# Patient Record
Sex: MALE | Race: WHITE | NOT HISPANIC OR LATINO | Employment: FULL TIME | URBAN - METROPOLITAN AREA
[De-identification: names, ages, dates, MRNs, and addresses within clinical notes are randomized per-mention and may not be internally consistent; named-entity substitution may affect disease eponyms.]

---

## 2019-04-17 ENCOUNTER — SKIN CANCER EXAM (OUTPATIENT)
Dept: URBAN - METROPOLITAN AREA CLINIC 34 | Facility: CLINIC | Age: 34
Setting detail: DERMATOLOGY
End: 2019-04-17

## 2019-04-17 DIAGNOSIS — Z41.9 ENCOUNTER FOR PROCEDURE FOR PURPOSES OTHER THAN REMEDYING HEALTH STATE, UNSPECIFIED: ICD-10-CM

## 2019-04-17 PROBLEM — D18.01 HEMANGIOMA OF SKIN AND SUBCUTANEOUS TISSUE: Status: RESOLVED | Noted: 2019-04-17

## 2019-04-17 PROCEDURE — 17110 DESTRUCT B9 LESION 1-14: CPT

## 2019-04-17 PROCEDURE — 99203 OFFICE O/P NEW LOW 30 MIN: CPT

## 2019-04-17 PROCEDURE — 11102 TANGNTL BX SKIN SINGLE LES: CPT

## 2019-04-20 ENCOUNTER — WART (OUTPATIENT)
Dept: URBAN - METROPOLITAN AREA CLINIC 34 | Facility: CLINIC | Age: 34
Setting detail: DERMATOLOGY
End: 2019-04-20

## 2019-04-20 DIAGNOSIS — L70.0 ACNE VULGARIS: ICD-10-CM

## 2019-04-20 PROCEDURE — OTHER NO CHARGE OFFICE VISIT: OTHER

## 2019-04-20 RX ORDER — AZITHROMYCIN DIHYDRATE 500 MG/1
1 TABLET TABLET, FILM COATED ORAL AS DIRECTED
Qty: 3 | Refills: 0
Start: 2019-04-20

## 2019-04-24 ENCOUNTER — FOLLOW UP (OUTPATIENT)
Dept: URBAN - METROPOLITAN AREA CLINIC 34 | Facility: CLINIC | Age: 34
Setting detail: DERMATOLOGY
End: 2019-04-24

## 2019-04-24 PROCEDURE — OTHER NO CHARGE OFFICE VISIT: OTHER

## 2021-01-12 ENCOUNTER — OFFICE VISIT (OUTPATIENT)
Dept: URBAN - METROPOLITAN AREA CLINIC 35 | Facility: CLINIC | Age: 36
End: 2021-01-12

## 2021-01-12 VITALS
SYSTOLIC BLOOD PRESSURE: 120 MMHG | TEMPERATURE: 98.2 F | BODY MASS INDEX: 26.77 KG/M2 | WEIGHT: 187 LBS | DIASTOLIC BLOOD PRESSURE: 80 MMHG | HEIGHT: 70 IN

## 2021-01-12 PROBLEM — 10743008: Status: ACTIVE | Noted: 2021-01-12

## 2021-01-12 RX ORDER — DICYCLOMINE HYDROCHLORIDE 10 MG/1
1 CAPSULE CAPSULE ORAL BID
Qty: 60 CAPSULE | Refills: 1 | OUTPATIENT
Start: 2021-01-12

## 2021-01-12 RX ORDER — DICYCLOMINE HYDROCHLORIDE 10 MG/1
1 TABLET CAPSULE ORAL BID
Qty: 60 TABLET | Refills: 0 | OUTPATIENT
Start: 2021-01-12

## 2021-01-12 NOTE — HPI-MIGRATED HPI
;     Change in Bowel habits : 35 year old male presents today with complaints of change in bowel habits and rectal pain. Onset of change of BM started in January 2020 and rectal pain started when he was 10 years old and is sporadic. He denies ever seeking treatment for this.   Within the past month patient admits increase in BM's; he has 2-4 BM's per day. Stools are mushy. He denies melena, blood, or mucus. No BM's through the night. No abdominal pain/cramps prior to BM. His previous BMs were 2 per day, with normal stool. He admits increased bloating and very foul smelling gas.   Patient states in January 2020 he was in the [facility] ER for a stomach virus with emesis and diarrhea. That lasted 3-4 days. Since then his stools has not returned to normal. Prior to illness he was having 1-2 BMs per day, with nl stool.    Spicy foods is an aggravating factor. He admits recently losing some weight in January 2020 with diet control.   He admits when he made the appointment he had 3-4 episodes of rectal pain that day. He states that pain is sharp and starts at the rectum and goes to his stomach. This normally occurs 1-2 times a year. He describes it as a jolt.  He denies rectal pain with BM or when he sits.    No abdominal pain  No previous testing related to rectal pain.   Labs completed 10/19/2020 with PCP CBC: NL BMP: NL;

## 2021-01-12 NOTE — EXAM-MIGRATED EXAMINATIONS
GENERAL APPEARANCE: - alert, in no acute distress, well developed, well nourished;   HEAD: - normocephalic, atraumatic;   EYES: - sclera anicteric bilaterally;   ORAL CAVITY: - mucosa moist;   THROAT: - clear;   NECK/THYROID: - neck supple, full range of motion, no cervical lymphadenopathy,  no thyromegaly, trachea midline;   SKIN: - warm and dry, no spider angiomata, palmar erythema or icterus;   HEART: - no murmurs, regular rate and rhythm, S1, S2 normal;   LUNGS: - clear to auscultation bilaterally, good air movement, no wheezes, rales, rhonchi;   ABDOMEN: - bowel sounds present, no masses palpable, no organomegaly , no rebound tenderness, soft, nontender, nondistended;   MUSCULOSKELETAL: - normal posture, normal gait and station, no decreased range of motion;   EXTREMITIES: - no clubbing, cyanosis, or edema;   PSYCH: - cooperative with exam, mood/affect full range;

## 2021-01-18 ENCOUNTER — TELEPHONE ENCOUNTER (OUTPATIENT)
Dept: URBAN - METROPOLITAN AREA CLINIC 35 | Facility: CLINIC | Age: 36
End: 2021-01-18

## 2021-02-09 ENCOUNTER — OFFICE VISIT (OUTPATIENT)
Dept: URBAN - METROPOLITAN AREA CLINIC 35 | Facility: CLINIC | Age: 36
End: 2021-02-09

## 2021-02-09 VITALS
BODY MASS INDEX: 26.77 KG/M2 | WEIGHT: 187 LBS | HEIGHT: 70 IN | TEMPERATURE: 97.4 F | SYSTOLIC BLOOD PRESSURE: 122 MMHG | DIASTOLIC BLOOD PRESSURE: 84 MMHG

## 2021-02-09 RX ORDER — DICYCLOMINE HYDROCHLORIDE 10 MG/1
1 TABLET CAPSULE ORAL BID
Qty: 60 TABLET | Refills: 0 | Status: ON HOLD | COMMUNITY
Start: 2021-01-12

## 2021-02-09 RX ORDER — SODIUM, POTASSIUM,MAG SULFATES 17.5-3.13G
177 ML SOLUTION, RECONSTITUTED, ORAL ORAL ONCE
Qty: 1 KIT | Refills: 0 | OUTPATIENT
Start: 2021-02-09

## 2021-02-09 RX ORDER — DICYCLOMINE HYDROCHLORIDE 10 MG/1
1 CAPSULE CAPSULE ORAL BID
Qty: 60 CAPSULE | Refills: 1 | Status: ACTIVE | COMMUNITY
Start: 2021-01-12

## 2021-02-09 NOTE — HPI-MIGRATED HPI
;     Change in Bowel habits : Patient presents today for follow up of change in bowel habits and to review stool results. Since last visit he has cut back on his lactose intake and started Florastor 250 mg BID plus Bentyl 10 mg po BID. He admits some improvement of gas, bloating but no resolution. There is improvement on stool frequency but not normal yet. No episodes of Proctalgia fugax since initial visit.  1 BM per day, with soft/mushy stool. No melena, blood, or mucus. He reports though 1-2 days out of the week he will have 3-4 BM's with loose stools.   Stools collected on 1/13/2021 Campylobacter: Not Detected Clostridium difficile: Not Detected E Coli: Not Detected Enterotoxigenic: Not Detected Salmonella: Not Detected Shigella: Not Detected Shiga-like toxin: Not Detected Vibrio cholerae: Not Detected  Norovirus: Not Detected Rotavirus: Not Detected Adenovirus: Not Detected Parasites: Not Detected Cryptosporidium: Not Detected Entamoeba histolytica: Not Detected Giardia: Not Detected  Ova and Parasites: Not Detected Fecal fat: Normal Calprotectin: Normal Pancreatic elastase: Normal Lactoferrin: Normal C. difficile toxin A/B Negative  Last visit (1/12/2021) 35 year old male presents today with complaints of change in bowel habits and rectal pain. Onset of change of BM started in January 2020 and rectal pain started when he was 10 years old and is sporadic. He denies ever seeking treatment for this.   Within the past month patient admits increase in BM's; he has 2-4 BM's per day. Stools are mushy. He denies melena, blood, or mucus. No BM's through the night. No abdominal pain/cramps prior to BM. His previous BMs were 2 per day, with normal stool. He admits increased bloating and very foul smelling gas.   Patient states in January 2020 he was in the [facility] ER for a stomach virus with emesis and diarrhea. That lasted 3-4 days. Since then his stools has not returned to normal. Prior to illness he was having 1-2 BMs per day, with nl stool.    Spicy foods is an aggravating factor. He admits recently losing some weight in January 2020 with diet control.   He admits when he made the appointment he had 3-4 episodes of rectal pain that day. He states that pain is sharp and starts at the rectum and goes to his stomach. This normally occurs 1-2 times a year. He describes it as a jolt.  He denies rectal pain with BM or when he sits.    No abdominal pain  No previous testing related to rectal pain.   Labs completed 10/19/2020 with PCP CBC: NL BMP: NL;

## 2021-02-09 NOTE — EXAM-MIGRATED EXAMINATIONS
GENERAL APPEARANCE: - alert, in no acute distress, well developed, well nourished;   HEAD: - normocephalic, atraumatic;   ORAL CAVITY: - mucosa moist, MP 3;   HEART: - no murmurs, regular rate and rhythm, S1, S2 normal;   LUNGS: - clear to auscultation bilaterally, good air movement, no wheezes, rales, rhonchi;   ABDOMEN: - bowel sounds present, no masses palpable, no organomegaly , no rebound tenderness, soft, nontender, nondistended;   EXTREMITIES: - no clubbing, cyanosis, or edema;

## 2021-02-16 ENCOUNTER — TELEPHONE ENCOUNTER (OUTPATIENT)
Dept: URBAN - METROPOLITAN AREA CLINIC 35 | Facility: CLINIC | Age: 36
End: 2021-02-16

## 2021-02-16 RX ORDER — DICYCLOMINE HYDROCHLORIDE 10 MG/1
1 TABLET CAPSULE ORAL BID
Qty: 60 TABLET | Refills: 1 | OUTPATIENT
Start: 2021-01-12

## 2021-02-22 ENCOUNTER — OFFICE VISIT (OUTPATIENT)
Dept: URBAN - METROPOLITAN AREA SURGERY CENTER 8 | Facility: SURGERY CENTER | Age: 36
End: 2021-02-22

## 2021-03-01 ENCOUNTER — TELEPHONE ENCOUNTER (OUTPATIENT)
Dept: URBAN - METROPOLITAN AREA CLINIC 35 | Facility: CLINIC | Age: 36
End: 2021-03-01

## 2021-03-01 RX ORDER — DICYCLOMINE HYDROCHLORIDE 10 MG/1
1 CAPSULE CAPSULE ORAL BID
Qty: 60 CAPSULE | Refills: 1 | Status: ACTIVE | COMMUNITY
Start: 2021-01-12

## 2021-03-01 RX ORDER — SODIUM, POTASSIUM,MAG SULFATES 17.5-3.13G
177 ML SOLUTION, RECONSTITUTED, ORAL ORAL ONCE
Qty: 1 KIT | Refills: 0 | Status: ACTIVE | COMMUNITY
Start: 2021-02-09

## 2021-03-01 RX ORDER — RIFAXIMIN 550 MG/1
1 TABLET TABLET ORAL THREE TIMES A DAY
Qty: 42 TABLET | Refills: 1 | OUTPATIENT
Start: 2021-03-02

## 2021-03-01 RX ORDER — DICYCLOMINE HYDROCHLORIDE 10 MG/1
1 TABLET CAPSULE ORAL BID
Qty: 60 TABLET | Refills: 1 | Status: ACTIVE | COMMUNITY
Start: 2021-01-12

## 2021-03-10 ENCOUNTER — TELEPHONE ENCOUNTER (OUTPATIENT)
Dept: URBAN - METROPOLITAN AREA CLINIC 35 | Facility: CLINIC | Age: 36
End: 2021-03-10

## 2021-03-10 ENCOUNTER — OFFICE VISIT (OUTPATIENT)
Dept: URBAN - METROPOLITAN AREA CLINIC 35 | Facility: CLINIC | Age: 36
End: 2021-03-10

## 2021-03-10 VITALS
WEIGHT: 185 LBS | SYSTOLIC BLOOD PRESSURE: 119 MMHG | BODY MASS INDEX: 26.48 KG/M2 | HEIGHT: 70 IN | DIASTOLIC BLOOD PRESSURE: 70 MMHG

## 2021-03-10 RX ORDER — SODIUM, POTASSIUM,MAG SULFATES 17.5-3.13G
177 ML SOLUTION, RECONSTITUTED, ORAL ORAL ONCE
Qty: 1 KIT | Refills: 0 | Status: ACTIVE | COMMUNITY
Start: 2021-02-09

## 2021-03-10 RX ORDER — DICYCLOMINE HYDROCHLORIDE 10 MG/1
1 TABLET CAPSULE ORAL BID
Qty: 60 TABLET | Refills: 1 | Status: ACTIVE | COMMUNITY
Start: 2021-01-12

## 2021-03-10 RX ORDER — DICYCLOMINE HYDROCHLORIDE 10 MG/1
1 CAPSULE CAPSULE ORAL BID
Qty: 60 CAPSULE | Refills: 1 | Status: ACTIVE | COMMUNITY
Start: 2021-01-12

## 2021-03-10 RX ORDER — RIFAXIMIN 550 MG/1
1 TABLET TABLET ORAL THREE TIMES A DAY
Qty: 42 TABLET | Refills: 1 | Status: ACTIVE | COMMUNITY
Start: 2021-03-02

## 2021-03-10 NOTE — HPI-MIGRATED HPI
;     Change in Bowel habits : Patient presents today for follow up of change in bowel habits and to review colonoscopy completed on 2/22/2021. Patient denies any complications post procedure.   Patient started the Xifaxan as for IBS-D on 3/6/2021, 2-3 BM's per day with a sense of urgency. He states that stool varies in consistency from soft mushy-pieces. He admits having takeout on Sunday that consisted of cheese. No melena, blood, or mucus.   No bloating, no abdominal plain.    No nocturnal symptoms.  Patient is mildly lactose intolerant; certain lactose product.  Last visit (2/9/2021) Patient presents today for follow up of change in bowel habits and to review stool results. Since last visit he has cut back on his lactose intake and started Florastor 250 mg BID plus Bentyl 10 mg po BID. He admits some improvement of gas, bloating but no resolution. There is improvement on stool frequency but not normal yet. No episodes of Proctalgia fugax since initial visit.  1 BM per day, with soft/mushy stool. No melena, blood, or mucus. He reports though 1-2 days out of the week he will have 3-4 BM's with loose stools.   Stools collected on 1/13/2021 Campylobacter: Not Detected Clostridium difficile: Not Detected E Coli: Not Detected Enterotoxigenic: Not Detected Salmonella: Not Detected Shigella: Not Detected Shiga-like toxin: Not Detected Vibrio cholerae: Not Detected  Norovirus: Not Detected Rotavirus: Not Detected Adenovirus: Not Detected Parasites: Not Detected Cryptosporidium: Not Detected Entamoeba histolytica: Not Detected Giardia: Not Detected  Ova and Parasites: Not Detected Fecal fat: Normal Calprotectin: Normal Pancreatic elastase: Normal Lactoferrin: Normal C. difficile toxin A/B Negative  Last visit (1/12/2021) 35 year old male presents today with complaints of change in bowel habits and rectal pain. Onset of change of BM started in January 2020 and rectal pain started when he was 10 years old and is sporadic. He denies ever seeking treatment for this.   Within the past month patient admits increase in BM's; he has 2-4 BM's per day. Stools are mushy. He denies melena, blood, or mucus. No BM's through the night. No abdominal pain/cramps prior to BM. His previous BMs were 2 per day, with normal stool. He admits increased bloating and very foul smelling gas.   Patient states in January 2020 he was in the [facility] ER for a stomach virus with emesis and diarrhea. That lasted 3-4 days. Since then his stools has not returned to normal. Prior to illness he was having 1-2 BMs per day, with nl stool.    Spicy foods is an aggravating factor. He admits recently losing some weight in January 2020 with diet control.   He admits when he made the appointment he had 3-4 episodes of rectal pain that day. He states that pain is sharp and starts at the rectum and goes to his stomach. This normally occurs 1-2 times a year. He describes it as a jolt.  He denies rectal pain with BM or when he sits.    No abdominal pain  No previous testing related to rectal pain.   Labs completed 10/19/2020 with PCP CBC: NL BMP: NL;

## 2021-05-11 ENCOUNTER — TELEPHONE ENCOUNTER (OUTPATIENT)
Dept: URBAN - METROPOLITAN AREA CLINIC 35 | Facility: CLINIC | Age: 36
End: 2021-05-11

## 2021-05-11 ENCOUNTER — OFFICE VISIT (OUTPATIENT)
Dept: URBAN - METROPOLITAN AREA CLINIC 35 | Facility: CLINIC | Age: 36
End: 2021-05-11

## 2021-05-11 VITALS
DIASTOLIC BLOOD PRESSURE: 70 MMHG | BODY MASS INDEX: 26.63 KG/M2 | WEIGHT: 186 LBS | TEMPERATURE: 97.7 F | SYSTOLIC BLOOD PRESSURE: 110 MMHG | HEIGHT: 70 IN

## 2021-05-11 RX ORDER — RIFAXIMIN 550 MG/1
1 TABLET TABLET ORAL THREE TIMES A DAY
Qty: 42 TABLET | Refills: 1 | Status: ON HOLD | COMMUNITY
Start: 2021-03-02

## 2021-05-11 RX ORDER — SODIUM, POTASSIUM,MAG SULFATES 17.5-3.13G
177 ML SOLUTION, RECONSTITUTED, ORAL ORAL ONCE
Qty: 1 KIT | Refills: 0 | Status: ON HOLD | COMMUNITY
Start: 2021-02-09

## 2021-05-11 RX ORDER — DICYCLOMINE HYDROCHLORIDE 10 MG/1
1 CAPSULE CAPSULE ORAL BID
Qty: 60 CAPSULE | Refills: 1 | Status: ON HOLD | COMMUNITY
Start: 2021-01-12

## 2021-05-11 RX ORDER — DICYCLOMINE HYDROCHLORIDE 10 MG/1
1 TABLET CAPSULE ORAL BID
Qty: 60 TABLET | Refills: 1 | Status: ACTIVE | COMMUNITY
Start: 2021-01-12

## 2021-05-11 RX ORDER — DICYCLOMINE HYDROCHLORIDE 10 MG/1
1 CAPSULE CAPSULE ORAL BID
Qty: 180 CAPSULE | Refills: 1 | OUTPATIENT
Start: 2021-05-11

## 2021-05-11 NOTE — EXAM-MIGRATED EXAMINATIONS
GENERAL APPEARANCE: - alert, in no acute distress, well developed, well nourished;   HEAD: - normocephalic, atraumatic;   EYES: - sclera anicteric bilaterally;   ORAL CAVITY: - mucosa moist;   HEART: - no murmurs, regular rate and rhythm, S1, S2 normal;   LUNGS: - clear to auscultation bilaterally, good air movement, no wheezes, rales, rhonchi;   ABDOMEN: - bowel sounds present, no masses palpable, no organomegaly , no rebound tenderness, soft, mild tenderness in LLQ area, nondistended;   PSYCH: - cooperative with exam, mood/affect full range;

## 2021-05-11 NOTE — HPI-MIGRATED HPI
;     Change in Bowel habits : Patient presents today for follow up of change in bowel habits. He completed 2 full course of Xifaxan around the end of April. He denies improvement of his bowel habits and bloating while taking the Xifaxan. He is also experiencing lots of belching and flatus. Bentyl helps the most with his gas and bloating. He continues to avoid lactose products as much as possible as he has noticed those bother him.   2 BMs per day, with soft stool. No melena, blood, or mucus. This past Sunday he had two episodes of sharp rectal pain that he describes as a jolt and only lasted for seconds.   He has received the Moderna vaccine.   Last visit 3.10.2021 Patient presents today for follow up of change in bowel habits and to review colonoscopy completed on 2/22/2021. Patient denies any complications post procedure.   Patient started the Xifaxan as for IBS-D on 3/6/2021, 2-3 BM's per day with a sense of urgency. He states that stool varies in consistency from soft mushy-pieces. He admits having takeout on Sunday that consisted of cheese. No melena, blood, or mucus.   No bloating, no abdominal plain.    No nocturnal symptoms.  Patient is mildly lactose intolerant; certain lactose product.  ;

## 2021-05-19 ENCOUNTER — OFFICE VISIT (OUTPATIENT)
Dept: URBAN - METROPOLITAN AREA SURGERY CENTER 8 | Facility: SURGERY CENTER | Age: 36
End: 2021-05-19

## 2021-05-24 ENCOUNTER — OFFICE VISIT (OUTPATIENT)
Dept: URBAN - METROPOLITAN AREA SURGERY CENTER 8 | Facility: SURGERY CENTER | Age: 36
End: 2021-05-24

## 2021-06-08 ENCOUNTER — OFFICE VISIT (OUTPATIENT)
Dept: URBAN - METROPOLITAN AREA CLINIC 35 | Facility: CLINIC | Age: 36
End: 2021-06-08

## 2021-06-12 ENCOUNTER — TELEPHONE ENCOUNTER (OUTPATIENT)
Dept: URBAN - METROPOLITAN AREA CLINIC 35 | Facility: CLINIC | Age: 36
End: 2021-06-12

## 2021-07-06 ENCOUNTER — DASHBOARD ENCOUNTERS (OUTPATIENT)
Age: 36
End: 2021-07-06

## 2021-07-06 ENCOUNTER — OFFICE VISIT (OUTPATIENT)
Dept: URBAN - METROPOLITAN AREA CLINIC 35 | Facility: CLINIC | Age: 36
End: 2021-07-06

## 2021-07-06 VITALS
HEIGHT: 70 IN | OXYGEN SATURATION: 96 % | BODY MASS INDEX: 26.48 KG/M2 | WEIGHT: 185 LBS | DIASTOLIC BLOOD PRESSURE: 74 MMHG | SYSTOLIC BLOOD PRESSURE: 112 MMHG | HEART RATE: 85 BPM

## 2021-07-06 PROBLEM — 197125005: Status: ACTIVE | Noted: 2021-03-02

## 2021-07-06 RX ORDER — DICYCLOMINE HYDROCHLORIDE 10 MG/1
1 CAPSULE CAPSULE ORAL BID
Qty: 60 CAPSULE | Refills: 1 | Status: DISCONTINUED | COMMUNITY
Start: 2021-01-12

## 2021-07-06 RX ORDER — SODIUM, POTASSIUM,MAG SULFATES 17.5-3.13G
177 ML SOLUTION, RECONSTITUTED, ORAL ORAL ONCE
Qty: 1 KIT | Refills: 0 | Status: DISCONTINUED | COMMUNITY
Start: 2021-02-09

## 2021-07-06 RX ORDER — DICYCLOMINE HYDROCHLORIDE 10 MG/1
1 CAPSULE CAPSULE ORAL BID
Qty: 180 CAPSULE | Refills: 1 | OUTPATIENT
Start: 2021-07-06

## 2021-07-06 RX ORDER — DICYCLOMINE HYDROCHLORIDE 10 MG/1
1 CAPSULE CAPSULE ORAL BID
Qty: 180 CAPSULE | Refills: 1 | Status: ACTIVE | COMMUNITY
Start: 2021-05-11

## 2021-07-06 RX ORDER — RIFAXIMIN 550 MG/1
1 TABLET TABLET ORAL THREE TIMES A DAY
Qty: 42 TABLET | Refills: 1 | Status: DISCONTINUED | COMMUNITY
Start: 2021-03-02

## 2021-07-06 RX ORDER — DICYCLOMINE HYDROCHLORIDE 10 MG/1
1 TABLET CAPSULE ORAL BID
Qty: 60 TABLET | Refills: 1 | Status: DISCONTINUED | COMMUNITY
Start: 2021-01-12

## 2021-07-06 RX ORDER — AMITRIPTYLINE HYDROCHLORIDE 10 MG/1
1 TABLET AT BEDTIME TABLET, FILM COATED ORAL ONCE A DAY
Qty: 90 TABLET | Refills: 1 | OUTPATIENT
Start: 2021-07-06

## 2021-07-06 NOTE — HPI-MIGRATED HPI
;     Change in Bowel habits : Patient presents today for a follow-up from the EGD that was done on 05/24/2021. Since the procedure, patient denies any odynophagia, dysphagia, or globus sensation. He had no complications following his procedure.   Currently has 2-3 BM a day, stools vary from soft with little form to mushy and no form with no blood, mucus or melena. He states the Dicyclomine helps relief the bloating and abdominal pain.    EGD and path documented below. EGD NL. All bx's were also NL.      Last visit (05/11/2021) Patient presents today for follow up of change in bowel habits. He completed 2 full course of Xifaxan around the end of April. He denies improvement of his bowel habits and bloating while taking the Xifaxan. He is also experiencing lots of belching and flatus. Bentyl helps the most with his gas and bloating. He continues to avoid lactose products as much as possible as he has noticed those bother him.   2 BM's per day, with soft stool. No melena, blood, or mucus. This past Sunday he had two episodes of sharp rectal pain that he describes as a jolt and only lasted for seconds.   He has received the Moderna vaccine.;

## 2022-01-11 ENCOUNTER — OFFICE VISIT (OUTPATIENT)
Dept: URBAN - METROPOLITAN AREA CLINIC 35 | Facility: CLINIC | Age: 37
End: 2022-01-11

## 2022-01-11 NOTE — HPI-IBS-D
Patient presents today for follow up of IBS-D. He admits/denies improvement with the use of amitriptyline and Bentyl as needed.   --BMS per day, with --stool. No melena, blood, or mucus. No abdominal pain or cramps?  Last visit 7/6/2021 Patient presents today for a follow-up from the EGD that was done on 05/24/2021. Since the procedure, patient denies any odynophagia, dysphagia, or globus sensation. He had no complications following his procedure.         Currently has 2-3 BM a day, stools vary from soft with little form to mushy and no form with no blood, mucus or melena. He states the Dicyclomine helps relief the bloating and abdominal pain.

## 2024-10-07 ENCOUNTER — APPOINTMENT (RX ONLY)
Age: 39
Setting detail: DERMATOLOGY
End: 2024-10-07

## 2024-10-07 DIAGNOSIS — D22 MELANOCYTIC NEVI: ICD-10-CM

## 2024-10-07 DIAGNOSIS — B07.8 OTHER VIRAL WARTS: ICD-10-CM

## 2024-10-07 DIAGNOSIS — L81.4 OTHER MELANIN HYPERPIGMENTATION: ICD-10-CM

## 2024-10-07 DIAGNOSIS — L82.1 OTHER SEBORRHEIC KERATOSIS: ICD-10-CM

## 2024-10-07 DIAGNOSIS — D18.0 HEMANGIOMA: ICD-10-CM

## 2024-10-07 DIAGNOSIS — Z87.2 PERSONAL HISTORY OF DISEASES OF THE SKIN AND SUBCUTANEOUS TISSUE: ICD-10-CM

## 2024-10-07 DIAGNOSIS — D49.2 NEOPLASM OF UNSPECIFIED BEHAVIOR OF BONE, SOFT TISSUE, AND SKIN: ICD-10-CM

## 2024-10-07 PROBLEM — D22.5 MELANOCYTIC NEVI OF TRUNK: Status: ACTIVE | Noted: 2024-10-07

## 2024-10-07 PROBLEM — D18.01 HEMANGIOMA OF SKIN AND SUBCUTANEOUS TISSUE: Status: ACTIVE | Noted: 2024-10-07

## 2024-10-07 PROCEDURE — 17110 DESTRUCTION B9 LES UP TO 14: CPT

## 2024-10-07 PROCEDURE — 11102 TANGNTL BX SKIN SINGLE LES: CPT | Mod: 59

## 2024-10-07 PROCEDURE — ? COUNSELING

## 2024-10-07 PROCEDURE — ? LIQUID NITROGEN

## 2024-10-07 PROCEDURE — ? BIOPSY BY SHAVE METHOD

## 2024-10-07 PROCEDURE — 99213 OFFICE O/P EST LOW 20 MIN: CPT | Mod: 25

## 2024-10-07 ASSESSMENT — LOCATION ZONE DERM
LOCATION ZONE: AXILLAE
LOCATION ZONE: ARM
LOCATION ZONE: TRUNK
LOCATION ZONE: SCALP

## 2024-10-07 ASSESSMENT — LOCATION DETAILED DESCRIPTION DERM
LOCATION DETAILED: LEFT INFERIOR OCCIPITAL SCALP
LOCATION DETAILED: RIGHT MEDIAL UPPER BACK
LOCATION DETAILED: LEFT MID-UPPER BACK
LOCATION DETAILED: LEFT AXILLARY VAULT
LOCATION DETAILED: LEFT SUPERIOR UPPER BACK
LOCATION DETAILED: LEFT ANTERIOR SHOULDER
LOCATION DETAILED: RIGHT MID-UPPER BACK
LOCATION DETAILED: RIGHT ANTERIOR SHOULDER
LOCATION DETAILED: INFERIOR THORACIC SPINE

## 2024-10-07 ASSESSMENT — LOCATION SIMPLE DESCRIPTION DERM
LOCATION SIMPLE: RIGHT SHOULDER
LOCATION SIMPLE: LEFT SHOULDER
LOCATION SIMPLE: RIGHT UPPER BACK
LOCATION SIMPLE: UPPER BACK
LOCATION SIMPLE: LEFT AXILLARY VAULT
LOCATION SIMPLE: LEFT UPPER BACK
LOCATION SIMPLE: POSTERIOR SCALP

## 2024-10-07 NOTE — PROCEDURE: LIQUID NITROGEN
Medical Necessity Clause: This procedure was medically necessary because the lesions that were treated were:
Spray Paint Technique: No
Spray Paint Text: The liquid nitrogen was applied to the skin utilizing a spray paint frosting technique.
Detail Level: Detailed
Show Aperture Variable?: Yes
Post-Care Instructions: I reviewed with the patient in detail post-care instructions. Patient is to wear sunprotection, and avoid picking at any of the treated lesions. Pt may apply Vaseline to crusted or scabbing areas.
Medical Necessity Information: It is in your best interest to select a reason for this procedure from the list below. All of these items fulfill various CMS LCD requirements except the new and changing color options.
Consent: The patient's consent was obtained including but not limited to risks of crusting, scabbing, blistering, scarring, darker or lighter pigmentary change, recurrence, incomplete removal and infection.

## 2024-10-07 NOTE — PROCEDURE: COUNSELING
Patient Specific Counseling (Will Not Stick From Patient To Patient): phx of bx proven SEVERE DN on right medial upper back, tx'ed via exc 09/2023
Detail Level: Detailed
Patient Specific Counseling (Will Not Stick From Patient To Patient): mild(+) left upper back 7/31/23
Detail Level: Generalized

## 2024-10-14 ENCOUNTER — APPOINTMENT (RX ONLY)
Age: 39
Setting detail: DERMATOLOGY
End: 2024-10-14